# Patient Record
Sex: FEMALE | Race: BLACK OR AFRICAN AMERICAN | NOT HISPANIC OR LATINO
[De-identification: names, ages, dates, MRNs, and addresses within clinical notes are randomized per-mention and may not be internally consistent; named-entity substitution may affect disease eponyms.]

---

## 2022-08-11 ENCOUNTER — APPOINTMENT (OUTPATIENT)
Dept: ORTHOPEDIC SURGERY | Facility: CLINIC | Age: 62
End: 2022-08-11

## 2022-08-11 VITALS — BODY MASS INDEX: 32.44 KG/M2 | HEIGHT: 64 IN | WEIGHT: 190 LBS

## 2022-08-11 DIAGNOSIS — Z78.9 OTHER SPECIFIED HEALTH STATUS: ICD-10-CM

## 2022-08-11 DIAGNOSIS — E78.00 PURE HYPERCHOLESTEROLEMIA, UNSPECIFIED: ICD-10-CM

## 2022-08-11 DIAGNOSIS — Z00.00 ENCOUNTER FOR GENERAL ADULT MEDICAL EXAMINATION W/OUT ABNORMAL FINDINGS: ICD-10-CM

## 2022-08-11 DIAGNOSIS — I10 ESSENTIAL (PRIMARY) HYPERTENSION: ICD-10-CM

## 2022-08-11 PROCEDURE — 99214 OFFICE O/P EST MOD 30 MIN: CPT | Mod: 25

## 2022-08-11 PROCEDURE — 73564 X-RAY EXAM KNEE 4 OR MORE: CPT | Mod: LT

## 2022-08-11 NOTE — HISTORY OF PRESENT ILLNESS
[9] : 9 [7] : 7 [Dull/Aching] : dull/aching [Tightness] : tightness [Meds] : meds [Walking] : walking [de-identified] : Patient c/o left knee pain for the past 3-4 weeks. No injury or trauma. Pain is aggravated with walking, and standing from a seated position. No catching or locking. She has been taking naprosyn with some relief. She had right knee issues and was treated with PT and a CSI and felt better.  [] : no [FreeTextEntry1] : left knee [FreeTextEntry3] : 3-4 weeks [FreeTextEntry5] : Patient is here today for New Injury of left knee. NKI. Patient has been in ongoing pain for the past 3-4 weeks. Patient complains of feeling tightness in the back of the knee when she wakes up in the morning [de-identified] : standing up

## 2022-08-11 NOTE — PROCEDURE
[Large Joint Injection] : Large joint injection [Left] : of the left [Knee] : knee [Pain] : pain [Inflammation] : inflammation [X-ray evidence of Osteoarthritis on this or prior visit] : x-ray evidence of Osteoarthritis on this or prior visit [Alcohol] : alcohol [Betadine] : betadine [Ethyl Chloride sprayed topically] : ethyl chloride sprayed topically [Sterile technique used] : sterile technique used [___ cc    3mg] :  Betamethasone (Celestone) ~Vcc of 3mg [___ cc    1%] : Lidocaine ~Vcc of 1%  [___ cc    0.25%] : Bupivacaine (Marcaine) ~Vcc of 0.25%  [Call if redness, pain or fever occur] : call if redness, pain or fever occur [] : Patient tolerated procedure well [Apply ice for 15min out of every hour for the next 12-24 hours as tolerated] : apply ice for 15 minutes out of every hour for the next 12-24 hours as tolerated [Previous OTC use and PT nontherapeutic] : patient has tried OTC's including aspirin, Ibuprofen, Aleve, etc or prescription NSAIDS, and/or exercises at home and/or physical therapy without satisfactory response [Patient had decreased mobility in the joint] : patient had decreased mobility in the joint [Risks, benefits, alternatives discussed / Verbal consent obtained] : the risks benefits, and alternatives have been discussed, and verbal consent was obtained [Altered anatomic landmarks d/t erosive arthritis] : altered anatomic landmarks d/t erosive arthritis [All ultrasound images have been permanently captured and stored accordingly in our picture archiving and communication system] : All ultrasound images have been permanently captured and stored accordingly in our picture archiving and communication system

## 2022-08-11 NOTE — IMAGING
[Left] : left knee [All Views] : anteroposterior, lateral, skyline, and anteroposterior standing [Degenerative change] : Degenerative change

## 2022-08-11 NOTE — PHYSICAL EXAM
[Left] : left knee [] : no erythema [TWNoteComboBox7] : flexion 100 degrees [de-identified] : extension 7 degrees

## 2022-08-11 NOTE — DISCUSSION/SUMMARY
[de-identified] : General Dx Discussion\par The patient was advised of the diagnosis. The natural history of the pathology was explained in full to the patient in layman's terms. All questions were answered. The risks and benefits of surgical and non-surgical treatment alternatives were explained in full to the patient.\par \par CSI today and tolerated well. \par Also recommend a course of PT.\par \par Entered by Aline ARIAS acting as a scribe.\par Instructions: Dr. Gresham- The documentation recorded by the scribe accurately reflects the service I personally performed and the decisions made by me.\par

## 2022-09-22 ENCOUNTER — APPOINTMENT (OUTPATIENT)
Dept: ORTHOPEDIC SURGERY | Facility: CLINIC | Age: 62
End: 2022-09-22

## 2022-10-20 ENCOUNTER — APPOINTMENT (OUTPATIENT)
Dept: ORTHOPEDIC SURGERY | Facility: CLINIC | Age: 62
End: 2022-10-20

## 2022-10-20 ENCOUNTER — FORM ENCOUNTER (OUTPATIENT)
Age: 62
End: 2022-10-20

## 2022-10-20 VITALS — WEIGHT: 190 LBS | BODY MASS INDEX: 32.44 KG/M2 | HEIGHT: 64 IN

## 2022-10-20 DIAGNOSIS — M50.90 CERVICAL DISC DISORDER, UNSPECIFIED, UNSPECIFIED CERVICAL REGION: ICD-10-CM

## 2022-10-20 DIAGNOSIS — S16.1XXA STRAIN OF MUSCLE, FASCIA AND TENDON AT NECK LEVEL, INITIAL ENCOUNTER: ICD-10-CM

## 2022-10-20 PROCEDURE — 99214 OFFICE O/P EST MOD 30 MIN: CPT

## 2022-10-20 PROCEDURE — 72040 X-RAY EXAM NECK SPINE 2-3 VW: CPT

## 2022-10-20 PROCEDURE — 73030 X-RAY EXAM OF SHOULDER: CPT | Mod: LT

## 2022-10-20 RX ORDER — CYCLOBENZAPRINE HYDROCHLORIDE 5 MG/1
5 TABLET, FILM COATED ORAL
Qty: 30 | Refills: 0 | Status: COMPLETED | COMMUNITY
Start: 2022-10-20 | End: 2022-11-19

## 2022-10-20 RX ORDER — METHYLPREDNISOLONE 4 MG/1
4 TABLET ORAL
Qty: 1 | Refills: 0 | Status: ACTIVE | COMMUNITY
Start: 2022-10-20 | End: 1900-01-01

## 2022-10-20 NOTE — HISTORY OF PRESENT ILLNESS
[Tightness] : tightness [Meds] : meds [Walking] : walking [9] : 9 [0] : 0 [Dull/Aching] : dull/aching [Localized] : localized [Throbbing] : throbbing [Intermittent] : intermittent [Leisure] : leisure [de-identified] : Here for f/u left knee. She had CSI on 8/11/22 which helped a lot, but now her pain has returned. She also c/o left shoulder and neck pain.  [] : no [FreeTextEntry1] : left knee [FreeTextEntry3] : 3-4 weeks [FreeTextEntry5] : Patient is here today for follow up on left knee. Patient states there has been no improvement in pain since last visit. Patient has been doing physical therapy\par \par  [de-identified] : standing up  [de-identified] : Patient has been doing physical therapy 3x a week

## 2022-10-20 NOTE — IMAGING
[Disc space narrowing] : Disc space narrowing [Left] : left shoulder [Degenerative change] : Degenerative change

## 2022-10-20 NOTE — PHYSICAL EXAM
[Left] : left shoulder [Sitting] : sitting [Mild] : mild [TWNoteComboBox6] : internal rotation L5 [] : no erythema [TWNoteComboBox7] : flexion 100 degrees [de-identified] : extension 7 degrees

## 2022-10-20 NOTE — DISCUSSION/SUMMARY
[de-identified] : General Dx Discussion\par The patient was advised of the diagnosis. The natural history of the pathology was explained in full to the patient in layman's terms. All questions were answered. The risks and benefits of surgical and non-surgical treatment alternatives were explained in full to the patient.\par \par Case Discussed.\par Recommend MDP and flexeril as needed at night. \par Will get mri left shoulder for further evaluation of RCT. \par Will also get mri left knee for further evaluation of mmt. \par f/u after mri's\par \par Entered by Aline ARIAS acting as a scribe.\par Instructions: Dr. Gresham- The documentation recorded by the scribe accurately reflects the service I personally performed and the decisions made by me.\par

## 2022-10-20 NOTE — DISCUSSION/SUMMARY
[de-identified] : General Dx Discussion\par The patient was advised of the diagnosis. The natural history of the pathology was explained in full to the patient in layman's terms. All questions were answered. The risks and benefits of surgical and non-surgical treatment alternatives were explained in full to the patient.\par \par Case Discussed.\par Recommend MDP and flexeril as needed at night. \par Will get mri left shoulder for further evaluation of RCT. \par Will also get mri left knee for further evaluation of mmt. \par f/u after mri's\par \par Entered by Aline ARIAS acting as a scribe.\par Instructions: Dr. Gresham- The documentation recorded by the scribe accurately reflects the service I personally performed and the decisions made by me.\par

## 2022-10-20 NOTE — HISTORY OF PRESENT ILLNESS
[Tightness] : tightness [Meds] : meds [Walking] : walking [9] : 9 [0] : 0 [Dull/Aching] : dull/aching [Localized] : localized [Throbbing] : throbbing [Intermittent] : intermittent [Leisure] : leisure [de-identified] : Here for f/u left knee. She had CSI on 8/11/22 which helped a lot, but now her pain has returned. She also c/o left shoulder and neck pain.  [] : no [FreeTextEntry1] : left knee [FreeTextEntry3] : 3-4 weeks [FreeTextEntry5] : Patient is here today for follow up on left knee. Patient states there has been no improvement in pain since last visit. Patient has been doing physical therapy\par \par  [de-identified] : standing up  [de-identified] : Patient has been doing physical therapy 3x a week

## 2022-10-20 NOTE — PHYSICAL EXAM
[Left] : left shoulder [Sitting] : sitting [Mild] : mild [TWNoteComboBox6] : internal rotation L5 [] : no erythema [TWNoteComboBox7] : flexion 100 degrees [de-identified] : extension 7 degrees

## 2022-10-21 ENCOUNTER — APPOINTMENT (OUTPATIENT)
Dept: MRI IMAGING | Facility: CLINIC | Age: 62
End: 2022-10-21
Payer: MEDICARE

## 2022-10-21 PROCEDURE — 73221 MRI JOINT UPR EXTREM W/O DYE: CPT | Mod: LT

## 2022-10-21 PROCEDURE — 73721 MRI JNT OF LWR EXTRE W/O DYE: CPT | Mod: LT

## 2022-10-25 ENCOUNTER — EMERGENCY (EMERGENCY)
Facility: HOSPITAL | Age: 62
LOS: 1 days | Discharge: HOME | End: 2022-10-25
Attending: EMERGENCY MEDICINE | Admitting: EMERGENCY MEDICINE

## 2022-10-25 VITALS
SYSTOLIC BLOOD PRESSURE: 115 MMHG | RESPIRATION RATE: 16 BRPM | OXYGEN SATURATION: 99 % | TEMPERATURE: 98 F | DIASTOLIC BLOOD PRESSURE: 70 MMHG | HEART RATE: 76 BPM

## 2022-10-25 VITALS
TEMPERATURE: 99 F | SYSTOLIC BLOOD PRESSURE: 118 MMHG | DIASTOLIC BLOOD PRESSURE: 75 MMHG | HEART RATE: 70 BPM | RESPIRATION RATE: 16 BRPM | OXYGEN SATURATION: 100 %

## 2022-10-25 DIAGNOSIS — Z98.891 HISTORY OF UTERINE SCAR FROM PREVIOUS SURGERY: Chronic | ICD-10-CM

## 2022-10-25 PROCEDURE — 99284 EMERGENCY DEPT VISIT MOD MDM: CPT

## 2022-10-25 PROCEDURE — 93971 EXTREMITY STUDY: CPT | Mod: 26,LT

## 2022-10-25 PROCEDURE — 73562 X-RAY EXAM OF KNEE 3: CPT | Mod: 26,LT

## 2022-10-25 NOTE — ED PROVIDER NOTE - PATIENT PORTAL LINK FT
You can access the FollowMyHealth Patient Portal offered by [facility] by registering at the following website: [email or web address]. By joining [facility]’s FollowMyHealth portal, you will also be able to view your health information using other applications (apps) compatible with our system.

## 2022-10-25 NOTE — ED PROVIDER NOTE - CARE PROVIDER_API CALL
Tree Colvin  Internal Medicine  [id number] [id number]  Phone: [phone]  Fax: [phone]  Established Patient  Follow Up Time: Routine    Jose De Jesus Gresham)  Orthopaedic Sports Medicine; Orthopaedic Surgery  [address] [place] [address]  Phone: [phone]  Fax: [phone]  Established Patient  Follow Up Time: 4-6 Days

## 2022-10-25 NOTE — ED ADULT TRIAGE NOTE - CHIEF COMPLAINT QUOTE
none pt c/o left knee pain and swelling,, endorses pain behind knee. given steroids by PMD with no relief. PMHx HTN, HLD.

## 2022-10-25 NOTE — ED ADULT NURSE NOTE - OBJECTIVE STATEMENT
Patient is awake, A&O x 4, NAD, experiencing left knee pain for several weeks. She is being followed by her PCP and Ortho. She was given a steroid injection 3 months with relief. States, once the steroid injection "wore" off, the pain returned and she is having difficulty walking. She is having pain to the back of her left knee. No swelling, redness or tenderness note. Respirations equal and unlabored. Vitals taken, MD at bedside and will continue to monitor patient.

## 2022-10-25 NOTE — ED PROVIDER NOTE - CLINICAL SUMMARY MEDICAL DECISION MAKING FREE TEXT BOX
62F PMH HTN, HLD presents with acute on chronic L knee pain. Started Solu-Medrol taper Friday. Will check XR knee & Duplex to r/o baker cyst. Will give ice packs and plan to ace bandage wrap knee. Dispo likely home pending imaging studies.

## 2022-10-25 NOTE — ED PROVIDER NOTE - PROGRESS NOTE DETAILS
MD Mayer- Scans reviewed, patient with baker cyst, signs of osteoarthritis. L knee wrapped with ice pack. Patient to be d/c'ed home with outpatient ortho f/u.

## 2022-10-25 NOTE — ED PROVIDER NOTE - NS ED ROS FT
REVIEW OF SYSTEMS:    CONSTITUTIONAL: No weakness, fevers or chills  EYES/ENT: No visual changes;  No vertigo or throat pain   NECK: No pain or stiffness  RESPIRATORY: No cough, wheezing, hemoptysis; No shortness of breath  CARDIOVASCULAR: No chest pain or palpitations  GASTROINTESTINAL: No abdominal or epigastric pain. No nausea, vomiting, or hematemesis; No diarrhea or constipation. No melena or hematochezia.  GENITOURINARY: No dysuria, frequency or hematuria  NEUROLOGICAL: No numbness or weakness  MSK: L knee pain  SKIN: No itching, burning, rashes, or lesions   HEME: no easy bruising or unexplained bleeding  ENDO: no heat intolerance, no cold intolerance  PSYCH: no SI, or depression  All other review of systems is negative unless indicated above.

## 2022-10-25 NOTE — ED ADULT NURSE NOTE - CHIEF COMPLAINT QUOTE
pt c/o left knee pain and swelling,, endorses pain behind knee. given steroids by PMD with no relief. PMHx HTN, HLD.

## 2022-10-25 NOTE — ED PROVIDER NOTE - PROVIDER TOKENS
PROVIDER:[TOKEN:[[id number]],FOLLOWUP:[Routine],ESTABLISHEDPATIENT:[T]],PROVIDER:[TOKEN:[[id number]],FOLLOWUP:[4-6 Days],ESTABLISHEDPATIENT:[T]]

## 2022-10-25 NOTE — ED PROVIDER NOTE - OBJECTIVE STATEMENT
62F PMH HTN, HLD who presents with knee pain. Reports she developed L knee pain 4 months ago at which time she saw an orthopedist who gave her an injection in the knee. She had resolution of the pain for 4 months which returned this past week. She saw the orthopedist on Thursday who prescribed a methylprednisolone taper which she started Friday. She also completed MRI of the knee on Friday. Since starting the steroid taper, she can only take 3-4 steps without significant knee pain. Denies weakness in the knee, has no pain when not weight bearing. ROS otherwise negative. 62F PMH HTN, HLD who presents with knee pain. Reports she developed L knee pain 4 months ago at which time she saw an orthopedist who gave her an injection in the knee. She had resolution of the pain for 4 months which returned this past week. It is located in the back of her knee. She saw the orthopedist on Thursday who prescribed a methylprednisolone taper which she started Friday. She also completed MRI of the knee on Friday. Since starting the steroid taper, she can only take 3-4 steps without significant knee pain. No trauma to the knee. Denies weakness in the knee, has no pain when not weight bearing. ROS otherwise negative.

## 2022-10-25 NOTE — ED PROVIDER NOTE - PHYSICAL EXAMINATION
VITALS:   T(C): 36.6 (10-25-22 @ 07:29), Max: 36.6 (10-25-22 @ 07:29)  HR: 78 (10-25-22 @ 07:29) (76 - 78)  BP: 121/71 (10-25-22 @ 07:29) (115/70 - 121/71)  RR: 16 (10-25-22 @ 07:29) (16 - 16)  SpO2: 99% (10-25-22 @ 07:29) (99% - 99%)    GENERAL: NAD, lying in bed comfortably  HEAD:  Atraumatic, Normocephalic  EYES: EOMI, PERRLA, conjunctiva and sclera clear  ENT: Moist mucous membranes  NECK: Supple, No JVD  CHEST/LUNG: Clear to auscultation bilaterally; No rales, rhonchi, wheezing, or rubs. Unlabored respirations  HEART: Regular rate and rhythm; No murmurs, rubs, or gallops  ABDOMEN: BSx4; Soft, nontender, nondistended  EXTREMITIES:  2+ Peripheral Pulses, brisk capillary refill. No clubbing, cyanosis, or edema  MSK: L knee with mild swelling; ROM intact; no valgus/varus stress discomfort; no joint laxity  NERVOUS SYSTEM:  A&Ox3, no focal deficits   SKIN: No rashes or lesions  Psych: Normal speech, normal behavior, normal affect

## 2022-10-25 NOTE — ED PROVIDER NOTE - ATTENDING CONTRIBUTION TO CARE
agree with resident note    "62F PMH HTN, HLD who presents with knee pain. Reports she developed L knee pain 4 months ago at which time she saw an orthopedist who gave her an injection in the knee. She had resolution of the pain for 4 months which returned this past week. It is located in the back of her knee. She saw the orthopedist on Thursday who prescribed a methylprednisolone taper which she started Friday. She also completed MRI of the knee on Friday. Since starting the steroid taper, she can only take 3-4 steps without significant knee pain. No trauma to the knee. Denies weakness in the knee, has no pain when not weight bearing. ROS otherwise negative."    PE: well appearing; VSS: CTAB/L; s1 s2 no m/r/g abd soft/NT/ND ext: left knee; FROM; no redness, no swelling; mild popliteal pain    Imp: likely OA, has f/u with ortho; will get xray and US given return of pain and now popliteal to r/o bakers cyst/DVT

## 2022-10-25 NOTE — ED PROVIDER NOTE - NSFOLLOWUPINSTRUCTIONS_ED_ALL_ED_FT
Faulkner Cyst    WHAT YOU NEED TO KNOW:  A Baker cyst is a bulging lump of fluid behind your knee. A Baker cyst can develop if you have a knee injury, or a condition such as osteoarthritis or a connective tissue disorder. A Baker cyst may also be called a popliteal cyst.    DISCHARGE INSTRUCTIONS:    Seek care immediately if:   •You have severe pain.  •You have bruising on the ankle below the cyst.  •Your calf turns blue below the cyst.  •Your calf or knee is swollen or bleeding.    Call your doctor if:   •You have a fever.  •Your pain does not improve with medicine.  •You have questions or concerns about your condition or care.    Medicines:   •NSAIDs, such as ibuprofen, help decrease swelling, pain, and fever. NSAIDs can cause stomach bleeding or kidney problems in certain people. If you take blood thinner medicine, always ask your healthcare provider if NSAIDs are safe for you. Always read the medicine label and follow directions.  •Take your medicine as directed. Contact your healthcare provider if you think your medicine is not helping or if you have side effects. Tell your provider if you are allergic to any medicine. Keep a list of the medicines, vitamins, and herbs you take. Include the amounts, and when and why you take them. Bring the list or the pill bottles to follow-up visits. Carry your medicine list with you in case of an emergency.    Care for your knee:   •Rest as needed. Limit movement as your knee heals. This will help decrease the risk of more damage to your knee. You may need crutches to take weight off your injured knee. Use crutches as directed.  •Ice your knee. Ice helps decrease swelling and pain. Use an ice pack, or put ice in a plastic bag. Cover it with a towel before you place it on your skin. Ice your knee for 15 to 20 minutes, 3 to 4 times each day. Do this for 2 to 3 days.  •Support your knee. Wrap your knee with an elastic bandage. Ask your healthcare provider if you need a brace for more support. This will help decrease swelling and movement so your knee can heal.  •Elevate your knee. Use pillows to raise your knee above the level of your heart as often as you can. This will help decrease swelling. Do not put the pillow directly under your knee. Put it under your calf instead.  Elevate Leg  •Go to physical therapy as directed. A physical therapist teaches you exercises to help improve movement and strength, and to decrease pain.    Follow up with your doctor as directed: Write down your questions so you remember to ask them during your visits.

## 2022-10-25 NOTE — ED ADULT NURSE REASSESSMENT NOTE - NS ED NURSE REASSESS COMMENT FT1
pt resting in bed A&Ox4, some left knee swelling and pain 5/10. Ice pack and wrap. RR equal and unlabored, lung sounds clear, S1S2 audible, 2+ radial pulses, abdomen soft, non-tender, non-distended. VS stable. Call bell in reach. comfort measures provided. awaiting imaging results.

## 2022-10-27 ENCOUNTER — APPOINTMENT (OUTPATIENT)
Dept: ORTHOPEDIC SURGERY | Facility: CLINIC | Age: 62
End: 2022-10-27
Payer: MEDICARE

## 2022-10-27 VITALS — HEIGHT: 64 IN | WEIGHT: 190 LBS | BODY MASS INDEX: 32.44 KG/M2

## 2022-10-27 DIAGNOSIS — M23.92 UNSPECIFIED INTERNAL DERANGEMENT OF LEFT KNEE: ICD-10-CM

## 2022-10-27 DIAGNOSIS — M65.9 SYNOVITIS AND TENOSYNOVITIS, UNSPECIFIED: ICD-10-CM

## 2022-10-27 PROCEDURE — J3490M: CUSTOM

## 2022-10-27 PROCEDURE — 99214 OFFICE O/P EST MOD 30 MIN: CPT | Mod: 25

## 2022-10-27 PROCEDURE — 20611 DRAIN/INJ JOINT/BURSA W/US: CPT | Mod: LT

## 2022-10-27 NOTE — PROCEDURE
[Large Joint Injection] : Large joint injection [Left] : of the left [Knee] : knee [Pain] : pain [Inflammation] : inflammation [X-ray evidence of Osteoarthritis on this or prior visit] : x-ray evidence of Osteoarthritis on this or prior visit [Repeat series performed] : repeat series performed [Alcohol] : alcohol [Betadine] : betadine [Ethyl Chloride sprayed topically] : ethyl chloride sprayed topically [Sterile technique used] : sterile technique used [___ cc    3mg] :  Betamethasone (Celestone) ~Vcc of 3mg [___ cc    1%] : Lidocaine ~Vcc of 1%  [___ cc    0.25%] : Bupivacaine (Marcaine) ~Vcc of 0.25%  [] : Patient tolerated procedure well [Call if redness, pain or fever occur] : call if redness, pain or fever occur [Apply ice for 15min out of every hour for the next 12-24 hours as tolerated] : apply ice for 15 minutes out of every hour for the next 12-24 hours as tolerated [Previous OTC use and PT nontherapeutic] : patient has tried OTC's including aspirin, Ibuprofen, Aleve, etc or prescription NSAIDS, and/or exercises at home and/or physical therapy without satisfactory response [Patient had decreased mobility in the joint] : patient had decreased mobility in the joint [Risks, benefits, alternatives discussed / Verbal consent obtained] : the risks benefits, and alternatives have been discussed, and verbal consent was obtained [Prior failure or difficult injection] : prior failure or difficult injection [Altered anatomic landmarks d/t erosive arthritis] : altered anatomic landmarks d/t erosive arthritis [All ultrasound images have been permanently captured and stored accordingly in our picture archiving and communication system] : All ultrasound images have been permanently captured and stored accordingly in our picture archiving and communication system

## 2022-10-27 NOTE — DISCUSSION/SUMMARY
[de-identified] : General Dx Discussion\par The patient was advised of the diagnosis. The natural history of the pathology was explained in full to the patient in layman's terms. All questions were answered. The risks and benefits of surgical and non-surgical treatment alternatives were explained in full to the patient.\par \par Case Discussed.\par Advised best treatment option for her left knee is surgery for TKR. \par Advised she can try a course of physical therapy.\par Would recommend a course of physical therapy for her left shoulder. \par CSI today left knee and tolerated well. \par f/u 6 weeks. \par \par Entered by Aline ARIAS acting as a scribe.\par Instructions: Dr. Gresham- The documentation recorded by the scribe accurately reflects the service I personally performed and the decisions made by me.\par

## 2022-10-27 NOTE — HISTORY OF PRESENT ILLNESS
[Dull/Aching] : dull/aching [Throbbing] : throbbing [Household chores] : household chores [Leisure] : leisure [Rest] : rest [Walking] : walking [Retired] : Work status: retired [9] : 9 [0] : 0 [Localized] : localized [Intermittent] : intermittent [de-identified] : Here for f/u left knee and left shoulder to discuss mri results. She continues to have pain. She states she went to the ER a few days ago due to the pain in her left knee/leg. She had a doppler done of her LLE that was negative for DVT.  [] : no [FreeTextEntry1] : left shoulder and left knee [FreeTextEntry5] : Patient is here today for New Injury of left shoulder/ follow up on left knee. No improvement in pain in knee since last visit. Patient has been doing physical therapy for knee. Pain in shoulder started 3 weeks ago with NKI. Patient has difficulty lifting her arm all the way. Pain is localized to her shoulder/neck. [de-identified] : standing up, lifting arm

## 2022-10-27 NOTE — DATA REVIEWED
[Shoulder] : shoulder [MRI] : MRI [Left] : left [Knee] : knee [Report was reviewed and noted in the chart] : The report was reviewed and noted in the chart [FreeTextEntry1] : 1. Moderate-grade partial tearing of the supraspinatus and subscapularis tendon insertions with delamination \par and mild partial tearing of the anterior infraspinatus tendon insertion without retraction.\par 2. Severe partial tearing of the biceps tendon with severe tenosynovitis.\par 3. Tearing of the superior, anterior and inferior labrum with effusion, synovitis, and capsulitis.\par 4. AC joint arthrosis, lateral acromial bone spurs, and subacromial bursitis.\par 5. Mild generalized muscle atrophy without acute fracture. [FreeTextEntry2] : 1. Tricompartmental arthrosis with complex medial meniscal tearing and tricompartmental chondral loss, joint \par space narrowing, and osteophytes with mild subchondral edema in medial and patellofemoral compartments, \par effusion, synovitis, popliteal cyst, and small scattered loose bodies.\par 2. Chronic ligament sprains, MCL laxity, and extensor mechanism tendinopathy with lateral meniscal \par degeneration.\par 3. No acute fracture.

## 2022-10-27 NOTE — PHYSICAL EXAM
[Sitting] : sitting [Mild] : mild [Left] : left knee [TWNoteComboBox6] : internal rotation L5 [] : no erythema [TWNoteComboBox7] : flexion 100 degrees [de-identified] : extension 7 degrees

## 2022-10-28 PROBLEM — I10 ESSENTIAL (PRIMARY) HYPERTENSION: Chronic | Status: ACTIVE | Noted: 2022-10-25

## 2022-10-28 PROBLEM — E78.5 HYPERLIPIDEMIA, UNSPECIFIED: Chronic | Status: ACTIVE | Noted: 2022-10-25

## 2022-11-03 ENCOUNTER — RX RENEWAL (OUTPATIENT)
Age: 62
End: 2022-11-03

## 2022-11-03 RX ORDER — CHLORZOXAZONE 250 MG/1
250 TABLET ORAL AT BEDTIME
Qty: 30 | Refills: 0 | Status: COMPLETED | COMMUNITY
Start: 2022-10-24 | End: 1900-01-01

## 2022-12-13 ENCOUNTER — RX RENEWAL (OUTPATIENT)
Age: 62
End: 2022-12-13

## 2022-12-20 ENCOUNTER — APPOINTMENT (OUTPATIENT)
Dept: ORTHOPEDIC SURGERY | Facility: CLINIC | Age: 62
End: 2022-12-20
Payer: MEDICARE

## 2022-12-20 PROCEDURE — 99212 OFFICE O/P EST SF 10 MIN: CPT

## 2022-12-20 NOTE — ASSESSMENT
[FreeTextEntry1] : discussed timing of steroid injections and possible HA injection in the future.\par she feels well. She will return as needed. \par questions answered.

## 2022-12-20 NOTE — PHYSICAL EXAM
[Left] : left knee [NL (0)] : extension 0 degrees [5___] : hamstring 5[unfilled]/5 [] : no tenderness [TWNoteComboBox7] : flexion 120 degrees

## 2023-01-21 ENCOUNTER — RX RENEWAL (OUTPATIENT)
Age: 63
End: 2023-01-21

## 2023-02-16 ENCOUNTER — APPOINTMENT (OUTPATIENT)
Dept: ORTHOPEDIC SURGERY | Facility: CLINIC | Age: 63
End: 2023-02-16
Payer: MEDICARE

## 2023-02-16 VITALS — WEIGHT: 175 LBS | HEIGHT: 64 IN | BODY MASS INDEX: 29.88 KG/M2

## 2023-02-16 DIAGNOSIS — M75.112 INCOMPLETE ROTATOR CUFF TEAR OR RUPTURE OF LEFT SHOULDER, NOT SPECIFIED AS TRAUMATIC: ICD-10-CM

## 2023-02-16 PROCEDURE — 20611 DRAIN/INJ JOINT/BURSA W/US: CPT

## 2023-02-16 PROCEDURE — J3490M: CUSTOM

## 2023-02-16 PROCEDURE — 99213 OFFICE O/P EST LOW 20 MIN: CPT | Mod: 25

## 2023-02-16 NOTE — DISCUSSION/SUMMARY
[de-identified] : General Dx Discussion\par The patient was advised of the diagnosis. The natural history of the pathology was explained in full to the patient in layman's terms. All questions were answered. The risks and benefits of surgical and non-surgical treatment alternatives were explained in full to the patient.\par \par Case Discussed.\par CSI today left knee and tolerated well.\par Will refer to Dr. Rudd for further evaluation, treatment and discussion of possible TKR. \par Once again discussed left shoulder arthroscopic surgery rcr.\par All questions answered, but patient declines surgery at this time. \par \par \par Entered by Aline ARIAS acting as a scribe.\par Instructions: Dr. Gresham- The documentation recorded by the scribe accurately reflects the service I personally performed and the decisions made by me.\par

## 2023-02-16 NOTE — HISTORY OF PRESENT ILLNESS
[8] : 8 [4] : 4 [Sharp] : sharp [Constant] : constant [Household chores] : household chores [Leisure] : leisure [Sleep] : sleep [Retired] : Work status: retired [de-identified] : Patient c/o increasing left knee pain over the past few days. No new injury. She had good relief last November after CSI.  [] : no [FreeTextEntry1] : Left knee and left shoulder [FreeTextEntry9] : Tylenol [de-identified] : activity [de-identified] : Stretching

## 2023-02-16 NOTE — PROCEDURE
[Large Joint Injection] : Large joint injection [Left] : of the left [Knee] : knee [Pain] : pain [Inflammation] : inflammation [X-ray evidence of Osteoarthritis on this or prior visit] : x-ray evidence of Osteoarthritis on this or prior visit [Repeat series performed] : repeat series performed [Alcohol] : alcohol [Betadine] : betadine [Ethyl Chloride sprayed topically] : ethyl chloride sprayed topically [Sterile technique used] : sterile technique used [___ cc    3mg] :  Betamethasone (Celestone) ~Vcc of 3mg [___ cc    1%] : Lidocaine ~Vcc of 1%  [___ cc    0.5%] : Bupivacaine (Marcaine) ~Vcc of 0.5%  [] : Patient tolerated procedure well [Call if redness, pain or fever occur] : call if redness, pain or fever occur [Apply ice for 15min out of every hour for the next 12-24 hours as tolerated] : apply ice for 15 minutes out of every hour for the next 12-24 hours as tolerated [Previous OTC use and PT nontherapeutic] : patient has tried OTC's including aspirin, Ibuprofen, Aleve, etc or prescription NSAIDS, and/or exercises at home and/or physical therapy without satisfactory response [Patient had decreased mobility in the joint] : patient had decreased mobility in the joint [Risks, benefits, alternatives discussed / Verbal consent obtained] : the risks benefits, and alternatives have been discussed, and verbal consent was obtained [Prior failure or difficult injection] : prior failure or difficult injection [All ultrasound images have been permanently captured and stored accordingly in our picture archiving and communication system] : All ultrasound images have been permanently captured and stored accordingly in our picture archiving and communication system

## 2023-02-16 NOTE — PHYSICAL EXAM
[NL (0)] : extension 0 degrees [5___] : hamstring 5[unfilled]/5 [Left] : left shoulder [Sitting] : sitting [Mild] : mild [TWNoteComboBox6] : internal rotation L5 [] : no effusion [TWNoteComboBox7] : flexion 100 degrees

## 2023-04-20 ENCOUNTER — APPOINTMENT (OUTPATIENT)
Dept: ORTHOPEDIC SURGERY | Facility: CLINIC | Age: 63
End: 2023-04-20
Payer: MEDICARE

## 2023-04-20 VITALS — BODY MASS INDEX: 27.66 KG/M2 | HEIGHT: 64 IN | WEIGHT: 162 LBS

## 2023-04-20 DIAGNOSIS — M25.512 PAIN IN LEFT SHOULDER: ICD-10-CM

## 2023-04-20 DIAGNOSIS — M77.8 OTHER ENTHESOPATHIES, NOT ELSEWHERE CLASSIFIED: ICD-10-CM

## 2023-04-20 PROCEDURE — 99214 OFFICE O/P EST MOD 30 MIN: CPT

## 2023-04-21 PROBLEM — M77.8 TENDINITIS OF LEFT SHOULDER: Status: ACTIVE | Noted: 2022-10-20

## 2023-04-21 PROBLEM — M25.512 LEFT SHOULDER PAIN, UNSPECIFIED CHRONICITY: Status: ACTIVE | Noted: 2023-04-21

## 2023-04-21 NOTE — PHYSICAL EXAM
[Left] : left shoulder [Sitting] : sitting [Mild] : mild [5 ___] : forward flexion 5[unfilled]/5 [4___] : abduction 4[unfilled]/5 [5___] : internal rotation 5[unfilled]/5 [] : positive drop-arm test [TWNoteComboBox7] : active forward flexion 90 degrees [TWNoteComboBox4] : passive forward flexion 130 degrees [de-identified] : active abduction 65 degrees [TWNoteComboBox9] : passive abduction 110 degrees [TWNoteComboBox6] : internal rotation L5 [de-identified] : external rotation 50 degrees

## 2023-04-21 NOTE — DISCUSSION/SUMMARY
[de-identified] : General Dx Discussion\par The patient was advised of the diagnosis. The natural history of the pathology was explained in full to the patient in layman's terms. All questions were answered. The risks and benefits of surgical and non-surgical treatment alternatives were explained in full to the patient.\par \par Case Discussed.\par MRI reviewed, has with pain and loss of motion has failed conservative care, PT and injection. \par Arthroscopy with decompression, debridement RCR discussed\par Risks, benefits and alternatives discussed. Risks include, but are not limited to infection, blood clot, nerve damage, recurrent tear, loss of motion, continued pain, worsened pain, need for another surgery in the future. Recurrent tear discussed \par Discussed that the surgery will not address any pain that she has from any OA she may have. She understands she will not be 100% better after surgery. Prolonged immobilization and rehabilitation discussed. Work limitations discussed.\par Questions answered.\par She expressed understanding and would like to proceed.\par \par The patient was advised of the diagnosis.  The natural history of the pathology was explained in full to the patient in layman's terms. All questions were answered.  The risks and benefits of surgical and non-surgical treatment alternatives were explained in full to the patient.  \par The patient demonstrated a full understanding of the surgical and non-surgical options.  The risks of surgery were outlined in full to the patient including but not limited to bleeding, scarring, infection, sepsis, neurologic injury, vascular injury, failure to resolve symptoms, symptom recurrence, the need for further surgery, non-healing, wound breakdown, deep vein thrombosis, pulmonary embolism, spontaneous osteonecrosis, anesthesia complications and even death.  The patient understood all the risks and accepted them and understood that other complications could occur that are not mentioned above.  The intraoperative plan, post-operative plan, post-operative expectations and limitations were explained in full.  Expectations from non-surgical treatment were explained in full as well.  The patient demonstrated a complete understanding of the treatment alternatives and requested the above-mentioned procedure.  This will be scheduled accordingly.\par

## 2023-04-21 NOTE — REASON FOR VISIT
[FreeTextEntry2] : Follow up-left shoulder has cont pain and loss of motion \par has failed PT and injections

## 2023-04-21 NOTE — HISTORY OF PRESENT ILLNESS
[9] : 9 [5] : 5 [Dull/Aching] : dull/aching [Constant] : constant [Household chores] : household chores [Leisure] : leisure [Sleep] : sleep [Rest] : rest [Retired] : Work status: retired [de-identified] : Patient is  here for a follow up on her left shoulder. [] : no [FreeTextEntry1] : Left shoulder [de-identified] : quick movement, activity [de-identified] : HEP

## 2023-04-23 ENCOUNTER — FORM ENCOUNTER (OUTPATIENT)
Age: 63
End: 2023-04-23

## 2023-05-17 ENCOUNTER — APPOINTMENT (OUTPATIENT)
Age: 63
End: 2023-05-17
Payer: MEDICARE

## 2023-05-17 PROCEDURE — 29828 SHO ARTHRS SRG BICP TENODSIS: CPT | Mod: LT

## 2023-05-17 PROCEDURE — 29826 SHO ARTHRS SRG DECOMPRESSION: CPT | Mod: LT

## 2023-05-17 PROCEDURE — 29827 SHO ARTHRS SRG RT8TR CUF RPR: CPT | Mod: LT

## 2023-05-17 RX ORDER — OXYCODONE AND ACETAMINOPHEN 5; 325 MG/1; MG/1
5-325 TABLET ORAL
Qty: 30 | Refills: 0 | Status: COMPLETED | COMMUNITY
Start: 2023-05-17 | End: 2023-05-22

## 2023-05-18 ENCOUNTER — NON-APPOINTMENT (OUTPATIENT)
Age: 63
End: 2023-05-18

## 2023-05-26 ENCOUNTER — APPOINTMENT (OUTPATIENT)
Dept: ORTHOPEDIC SURGERY | Facility: CLINIC | Age: 63
End: 2023-05-26
Payer: MEDICARE

## 2023-05-26 VITALS — WEIGHT: 162 LBS | BODY MASS INDEX: 27.66 KG/M2 | HEIGHT: 64 IN

## 2023-05-26 PROCEDURE — 99024 POSTOP FOLLOW-UP VISIT: CPT

## 2023-05-26 NOTE — HISTORY OF PRESENT ILLNESS
[0] : 0 [de-identified] : Patient presents for 1st visit s/p left shoulder arthroscopy on 5/17/23. [] : no [de-identified] : 5/18/23 [de-identified] : 5/18/23 [de-identified] : left shoulder scope

## 2023-05-26 NOTE — DISCUSSION/SUMMARY
[de-identified] : General Dx Discussion\par The patient was advised of the diagnosis. The natural history of the pathology was explained in full to the patient in layman's terms. All questions were answered. The risks and benefits of surgical and non-surgical treatment alternatives were explained in full to the patient.\par \par will start PT passive rom only \par f/u 2 weeks \par cont immobilizer

## 2023-06-08 ENCOUNTER — APPOINTMENT (OUTPATIENT)
Dept: ORTHOPEDIC SURGERY | Facility: CLINIC | Age: 63
End: 2023-06-08
Payer: MEDICARE

## 2023-06-08 VITALS — BODY MASS INDEX: 27.66 KG/M2 | WEIGHT: 162 LBS | HEIGHT: 64 IN

## 2023-06-08 DIAGNOSIS — Z98.890 OTHER SPECIFIED POSTPROCEDURAL STATES: ICD-10-CM

## 2023-06-08 PROCEDURE — 99024 POSTOP FOLLOW-UP VISIT: CPT

## 2023-06-08 NOTE — DISCUSSION/SUMMARY
[de-identified] : General Dx Discussion\par The patient was advised of the diagnosis. The natural history of the pathology was explained in full to the patient in layman's terms. All questions were answered. The risks and benefits of surgical and non-surgical treatment alternatives were explained in full to the patient.\par \par cont immobilizer \par will advance passive rom

## 2023-06-08 NOTE — HISTORY OF PRESENT ILLNESS
[5] : 5 [Dull/Aching] : dull/aching [Constant] : constant [Leisure] : leisure [Meds] : meds [de-identified] : Post op visit for her left shoulder, DOS 5/17/23. [] : This patient has had an injection before: no [FreeTextEntry1] : Left shoulder [de-identified] : 05/26/23 [de-identified] : 05/17/23 [de-identified] : Patient uses pain killers when needed. [de-identified] : 05/17/23 [de-identified] : Shoulder Surgery

## 2023-06-08 NOTE — PHYSICAL EXAM
[Left] : left shoulder [] : shoulder immobilizer in place [TWNoteComboBox7] : active forward flexion 90 degrees [TWNoteComboBox9] : passive abduction 80 degrees

## 2023-06-22 ENCOUNTER — APPOINTMENT (OUTPATIENT)
Dept: ORTHOPEDIC SURGERY | Facility: CLINIC | Age: 63
End: 2023-06-22
Payer: MEDICARE

## 2023-06-22 VITALS — WEIGHT: 162 LBS | HEIGHT: 64 IN | BODY MASS INDEX: 27.66 KG/M2

## 2023-06-22 DIAGNOSIS — M75.52 BURSITIS OF LEFT SHOULDER: ICD-10-CM

## 2023-06-22 DIAGNOSIS — M75.112 INCOMPLETE ROTATOR CUFF TEAR OR RUPTURE OF LEFT SHOULDER, NOT SPECIFIED AS TRAUMATIC: ICD-10-CM

## 2023-06-22 PROCEDURE — 99024 POSTOP FOLLOW-UP VISIT: CPT

## 2023-06-22 NOTE — HISTORY OF PRESENT ILLNESS
[6] : 6 [4] : 4 [Dull/Aching] : dull/aching [] : yes [Retired] : Work status: retired [FreeTextEntry1] : L shoulder [de-identified] : PT

## 2023-06-22 NOTE — PHYSICAL EXAM
[Left] : left shoulder [Standing] : standing [Minimal] : minimal [] : negative drop-arm test [TWNoteComboBox7] : False [TWNoteComboBox4] : passive forward flexion 120 degrees [TWNoteComboBox9] : passive abduction 105 degrees

## 2023-06-22 NOTE — DISCUSSION/SUMMARY
[de-identified] : General Dx Discussion\par The patient was advised of the diagnosis. The natural history of the pathology was explained in full to the patient in layman's terms. All questions were answered. The risks and benefits of surgical and non-surgical treatment alternatives were explained in full to the patient.\par \par will start active rom no lifting weights \par f/u 4 weeks

## 2023-07-20 ENCOUNTER — APPOINTMENT (OUTPATIENT)
Dept: ORTHOPEDIC SURGERY | Facility: CLINIC | Age: 63
End: 2023-07-20
Payer: MEDICARE

## 2023-07-20 VITALS — WEIGHT: 162 LBS | BODY MASS INDEX: 27.66 KG/M2 | HEIGHT: 64 IN

## 2023-07-20 DIAGNOSIS — M75.122 COMPLETE ROTATOR CUFF TEAR OR RUPTURE OF LEFT SHOULDER, NOT SPECIFIED AS TRAUMATIC: ICD-10-CM

## 2023-07-20 PROCEDURE — 99024 POSTOP FOLLOW-UP VISIT: CPT

## 2023-07-20 NOTE — REASON FOR VISIT
[FreeTextEntry2] : Post op: L shoulder  doing well reports she has been going to PT once  in a while

## 2023-07-20 NOTE — PHYSICAL EXAM
[Left] : left shoulder [Standing] : standing [Trace] : trace [] : negative drop-arm test [TWNoteComboBox7] : active forward flexion 110 degrees [TWNoteComboBox4] : False [de-identified] : active abduction 80 degrees [TWNoteComboBox9] : False

## 2023-07-20 NOTE — HISTORY OF PRESENT ILLNESS
[1] : 2 [0] : 0 [Dull/Aching] : dull/aching [Occasional] : occasional [Physical therapy] : physical therapy [Retired] : Work status: retired [de-identified] : 63 year old female is here for a post op follow up on the L shoulder. DOS: 5/17/23\par  Patient states she has been doing well since the last visit. She continues to go to physical therapy which seems to slightly help. No new Compliant.  [] : no [FreeTextEntry1] : L shoulder [FreeTextEntry6] : Occasionally  [de-identified] : PT\par HEP

## 2023-07-20 NOTE — DISCUSSION/SUMMARY
[de-identified] : General Dx Discussion\par The patient was advised of the diagnosis. The natural history of the pathology was explained in full to the patient in layman's terms. All questions were answered. The risks and benefits of surgical and non-surgical treatment alternatives were explained in full to the patient.\par \par no lifting weights \par f/u 4 weeks \par will cont PT

## 2023-08-24 ENCOUNTER — APPOINTMENT (OUTPATIENT)
Dept: ORTHOPEDIC SURGERY | Facility: CLINIC | Age: 63
End: 2023-08-24

## 2024-05-03 ENCOUNTER — APPOINTMENT (OUTPATIENT)
Dept: ORTHOPEDIC SURGERY | Facility: CLINIC | Age: 64
End: 2024-05-03
Payer: MEDICARE

## 2024-05-03 DIAGNOSIS — M17.12 UNILATERAL PRIMARY OSTEOARTHRITIS, LEFT KNEE: ICD-10-CM

## 2024-05-03 PROCEDURE — 20611 DRAIN/INJ JOINT/BURSA W/US: CPT | Mod: LT

## 2024-05-03 PROCEDURE — J3490M: CUSTOM

## 2024-05-03 PROCEDURE — 99214 OFFICE O/P EST MOD 30 MIN: CPT | Mod: 25

## 2024-05-04 PROBLEM — M17.12 PRIMARY OSTEOARTHRITIS OF LEFT KNEE: Status: ACTIVE | Noted: 2022-08-11

## 2024-05-04 NOTE — DISCUSSION/SUMMARY
[de-identified] : General Dx Discussion The patient was advised of the diagnosis. The natural history of the pathology was explained in full to the patient in layman's terms. All questions were answered. The risks and benefits of surgical and non-surgical treatment alternatives were explained in full to the patient.    will try a CSI  will see Dr Gilbert winslow if she is a candidate for TKA questions answered

## 2024-05-04 NOTE — HISTORY OF PRESENT ILLNESS
[de-identified] : 05/03/2024- 65 yo F returning with left knee pain that started 1 month ago. Previous inj series helped until 1 month ago.

## 2024-05-04 NOTE — PHYSICAL EXAM
[Left] : left knee [NL (0)] : extension 0 degrees [5___] : hamstring 5[unfilled]/5 [Negative] : negative Alanna's [] : no pain with varus stress [TWNoteComboBox7] : flexion 110 degrees

## 2024-06-06 ENCOUNTER — APPOINTMENT (OUTPATIENT)
Dept: ORTHOPEDIC SURGERY | Facility: CLINIC | Age: 64
End: 2024-06-06